# Patient Record
Sex: FEMALE | Race: WHITE | NOT HISPANIC OR LATINO
[De-identification: names, ages, dates, MRNs, and addresses within clinical notes are randomized per-mention and may not be internally consistent; named-entity substitution may affect disease eponyms.]

---

## 2018-06-07 PROBLEM — Z00.00 ENCOUNTER FOR PREVENTIVE HEALTH EXAMINATION: Status: ACTIVE | Noted: 2018-06-07

## 2018-06-08 ENCOUNTER — RX RENEWAL (OUTPATIENT)
Age: 18
End: 2018-06-08

## 2018-06-08 DIAGNOSIS — N94.6 DYSMENORRHEA, UNSPECIFIED: ICD-10-CM

## 2018-06-18 ENCOUNTER — APPOINTMENT (OUTPATIENT)
Dept: PEDIATRICS | Facility: CLINIC | Age: 18
End: 2018-06-18

## 2018-10-02 ENCOUNTER — RX RENEWAL (OUTPATIENT)
Age: 18
End: 2018-10-02

## 2019-03-07 ENCOUNTER — RX RENEWAL (OUTPATIENT)
Age: 19
End: 2019-03-07

## 2019-07-02 ENCOUNTER — APPOINTMENT (OUTPATIENT)
Dept: PEDIATRICS | Facility: CLINIC | Age: 19
End: 2019-07-02
Payer: COMMERCIAL

## 2019-07-02 VITALS — WEIGHT: 150 LBS

## 2019-07-02 DIAGNOSIS — M22.2X2 PATELLOFEMORAL DISORDERS, LEFT KNEE: ICD-10-CM

## 2019-07-02 PROCEDURE — 99213 OFFICE O/P EST LOW 20 MIN: CPT

## 2019-07-02 NOTE — PHYSICAL EXAM
[NL] : no acute distress, alert [de-identified] : FROM in both knees; normal ambulation; no palpable effusion in L knee; mild swelling over patella tendon

## 2019-07-02 NOTE — HISTORY OF PRESENT ILLNESS
[FreeTextEntry6] : Started to get L patellar pain for past week.  Patient is athletic and walks a lot for work.  Was doing PT for ankle few years ago and incidentally diagnosed with patellar pain.  Never had xray.  Feels like a dull pain that is always present; not made worse with ambulating normally, sometimes worse with walking up stairs.  Has been taking advil which helps  little.

## 2019-07-19 ENCOUNTER — RX RENEWAL (OUTPATIENT)
Age: 19
End: 2019-07-19

## 2020-04-08 RX ORDER — NAPROXEN SODIUM 550 MG/1
550 TABLET ORAL
Qty: 90 | Refills: 0 | Status: ACTIVE | COMMUNITY
Start: 2018-06-08 | End: 1900-01-01

## 2020-09-03 DIAGNOSIS — D68.0 VON WILLEBRAND'S DISEASE: ICD-10-CM
